# Patient Record
(demographics unavailable — no encounter records)

---

## 2025-07-02 NOTE — HISTORY OF PRESENT ILLNESS
[FreeTextEntry1] : We began this TELEHEALTH EVALUATION AT 1:08 PM and ended at 1:57 pm.   The patient consents to conduct this evaluation via audio and video software in the home/apt and the examiner is off site.  Last exam: Sep 21, 2022  Mr. Handy reports when he eats "its the same reaction"; all this week, he notes he spends an hour or two coughing up mucus otherwise it feels like he's choking". it's clear mucus.  The albuterol helped him years ago.  Admits to being congested in his sinuses and I explained the sinopulmonary and esophageal tracts.  He had been at the [facility] but he notes that he worked around the [address], from before Sept 11 and including on 9/11 and walked home and then returned until the end of October.    He notes he hasn't had blood tinged sputum in the past month but previous to that he would note that every week he would notice less than a tablespoon of "a small liver" about a quarter of a teaspoon, but none in the past month.  Still coughs when he eats and when he goes outside and if he hydrates, he doesn't cough as much.  He has avoided mold but he has to close the window of his car because it irritates his sinuses he notes.  Denies headaches or nausea.  His eyes still water.  He denies shortness of breath unless he does not get up to eliminate the mucus that accummulates in his chest; though sometimes he hears a slight whistle e.g., if he is doing sit up and exhales.  He had not been evaluated by Dr. Andrea Donnelly at [place].  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  We discussed his current living quarters. Where he lives in the first floor he has kept it clean but there are leaks that occur in the floor above. He has portable AC units which he can hook up if needed.    Meds: he used the albuterol about a month ago at night when he feels like "he has to get up."  He has a wedge on his pillow to keep his head elevated.  He has to get the mucus out of his throat and chest and for the past year this has been more intense.    ROS: he fels 100% better when he takes a shower and also when he is outside but not around wet leaves or a caleb environment.   Assessment Smyptomatic w/continuing expectoration of awakening pt at night and upto recently some blood tinged sputum and choking on his food after eating; reactive airways and sinus congestion with past exposure to [facility] dust and later in 2017 until 2019 living in mold contaminated apartment.   Plan 1). Advised pt receive medical evaluation at [facility] program, otherwise, I will refer him to Pulmonary and ENT for re-assessments 2). Pt will send me the CT scan of his sinus report from the past 3). Mold and hypersensitivity panel ordered 4). Rx for levalbuterold inhaler written 5). RE-eval in 6 wks.   I personally spent a total of 59 minutes today in the care of this patient.

## 2025-07-02 NOTE — HISTORY OF PRESENT ILLNESS
[FreeTextEntry1] : We began this TELEHEALTH EVALUATION AT 1:08 PM and ended at 1:57 pm.   The patient consents to conduct this evaluation via audio and video software in the home/apt and the examiner is off site.  Last exam: Sep 21, 2022  Mr. Handy reports when he eats "its the same reaction"; all this week, he notes he spends an hour or two coughing up mucus otherwise it feels like he's choking". it's clear mucus.  The albuterol helped him years ago.  Admits to being congested in his sinuses and I explained the sinopulmonary and esophageal tracts.  He had been at the [facility] but he notes that he worked around the [address], from before Sept 11 and including on 9/11 and walked home and then returned until the end of October.    He notes he hasn't had blood tinged sputum in the past month but previous to that he would note that every week he would notice less than a tablespoon of "a small liver" about a quarter of a teaspoon, but none in the past month.  Still coughs when he eats and when he goes outside and if he hydrates, he doesn't cough as much.  He has avoided mold but he has to close the window of his car because it irritates his sinuses he notes.  Denies headaches or nausea.  His eyes still water.  He denies shortness of breath unless he does not get up to eliminate the mucus that accummulates in his chest; though sometimes he hears a slight whistle e.g., if he is doing sit up and exhales.  He had not been evaluated by Dr. Andrea Donnelly at [place].  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  We discussed his current living quarters. Where he lives in the first floor he has kept it clean but there are leaks that occur in the floor above. He has portable AC units which he can hook up if needed.    Meds: he used the albuterol about a month ago at night when he feels like "he has to get up."  He has a wedge on his pillow to keep his head elevated.  He has to get the mucus out of his throat and chest and for the past year this has been more intense.    ROS: he fels 100% better when he takes a shower and also when he is outside but not around wet leaves or a caleb environment.   Assessment Smyptomatic w/continuing expectoration of awakening pt at night and upto recently some blood tinged sputum and choking on his food after eating; reactive airways and sinus congestion with past exposure to [facility] dust and later in 2017 until 2019 living in mold contaminated apartment.   Plan 1). Advised pt receive medical evaluation at [facility] program, otherwise, I will refer him to Pulmonary and ENT for re-assessments 2). Pt will send me the CT scan of his sinus report from the past 3). Mold and hypersensitivity panel ordered 4). Rx for levalbuterold inhaler written 5). RE-eval in 6 wks.   I personally spent a total of 59 minutes today in the care of this patient.